# Patient Record
Sex: MALE | Race: WHITE | NOT HISPANIC OR LATINO | Employment: FULL TIME | URBAN - METROPOLITAN AREA
[De-identification: names, ages, dates, MRNs, and addresses within clinical notes are randomized per-mention and may not be internally consistent; named-entity substitution may affect disease eponyms.]

---

## 2022-09-29 ENCOUNTER — TELEPHONE (OUTPATIENT)
Dept: SCHEDULING | Facility: IMAGING CENTER | Age: 57
End: 2022-09-29

## 2022-09-29 ENCOUNTER — TELEPHONE (OUTPATIENT)
Dept: MEDICAL GROUP | Facility: LAB | Age: 57
End: 2022-09-29

## 2022-09-29 NOTE — TELEPHONE ENCOUNTER
NEW PATIENT VISIT PRE-VISIT PLANNING    1.  [employer] Patient is checked in Patient Demographics?Yes    2.  Immunizations were updated in Epic using Reconcile Outside Information activity? Yes         3.  Is this appointment scheduled as a Hospital Follow-Up? No    4.  Patient is due for the following Health Maintenance Topics:   Health Maintenance Due   Topic Date Due    IMM HEP B VACCINE (1 of 3 - 3-dose series) Never done    HEPATITIS C SCREENING  Never done    COVID-19 Vaccine (1) Never done    IMM DTaP/Tdap/Td Vaccine (1 - Tdap) Never done    COLORECTAL CANCER SCREENING  Never done    IMM ZOSTER VACCINES (1 of 2) Never done    IMM INFLUENZA (1) Never done     5.  Reviewed/Updated the following with patient:          Preferred Pharmacy? No          Preferred Lab? No          Preferred Communication? No          Allergies? No          Medications? NO     6.  Updated Care Team?          DME Company (gait device, O2, CPAP, etc.) NO          Other Specialists (eye doctor, derm, GYN, cardiology, endo, etc): NO    7.  AHA (Puls[employer]) form printed for Provider? N/A

## 2022-09-30 ENCOUNTER — OFFICE VISIT (OUTPATIENT)
Dept: MEDICAL GROUP | Facility: LAB | Age: 57
End: 2022-09-30
Payer: COMMERCIAL

## 2022-09-30 VITALS
OXYGEN SATURATION: 95 % | HEIGHT: 73 IN | WEIGHT: 183.9 LBS | HEART RATE: 66 BPM | SYSTOLIC BLOOD PRESSURE: 108 MMHG | BODY MASS INDEX: 24.37 KG/M2 | RESPIRATION RATE: 14 BRPM | DIASTOLIC BLOOD PRESSURE: 52 MMHG | TEMPERATURE: 96.3 F

## 2022-09-30 DIAGNOSIS — H66.90 ACUTE OTITIS MEDIA, UNSPECIFIED OTITIS MEDIA TYPE: ICD-10-CM

## 2022-09-30 DIAGNOSIS — G43.719 INTRACTABLE CHRONIC MIGRAINE WITHOUT AURA AND WITHOUT STATUS MIGRAINOSUS: ICD-10-CM

## 2022-09-30 DIAGNOSIS — E78.00 HYPERCHOLESTEROLEMIA: ICD-10-CM

## 2022-09-30 PROBLEM — G43.909 MIGRAINE HEADACHE: Status: ACTIVE | Noted: 2022-09-30

## 2022-09-30 PROBLEM — M54.17 LUMBOSACRAL RADICULOPATHY AT L5: Status: ACTIVE | Noted: 2022-06-09

## 2022-09-30 PROCEDURE — 99204 OFFICE O/P NEW MOD 45 MIN: CPT | Performed by: NURSE PRACTITIONER

## 2022-09-30 RX ORDER — MELOXICAM 15 MG/1
15 TABLET ORAL DAILY
COMMUNITY
Start: 2022-05-16 | End: 2023-05-16

## 2022-09-30 RX ORDER — AMOXICILLIN AND CLAVULANATE POTASSIUM 875; 125 MG/1; MG/1
1 TABLET, FILM COATED ORAL 2 TIMES DAILY
Qty: 14 TABLET | Refills: 0 | Status: SHIPPED | OUTPATIENT
Start: 2022-09-30

## 2022-09-30 RX ORDER — SUMATRIPTAN AND NAPROXEN SODIUM 85; 500 MG/1; MG/1
1 TABLET, FILM COATED ORAL
COMMUNITY
Start: 2022-05-17 | End: 2022-10-06 | Stop reason: SDUPTHER

## 2022-09-30 ASSESSMENT — PATIENT HEALTH QUESTIONNAIRE - PHQ9: CLINICAL INTERPRETATION OF PHQ2 SCORE: 0

## 2022-09-30 NOTE — ASSESSMENT & PLAN NOTE
This is a chronic condition, new to me. Patient managing with lifestyle modifications. Denies chest pain, shortness of breath, heart palpitations.

## 2022-09-30 NOTE — ASSESSMENT & PLAN NOTE
This is a chronic condition, new to me. Patient will taking sumatriptan-naproxen as abortive therapy with relief. He has noticed a decrease in frequency since he started changing his diet.

## 2022-09-30 NOTE — PROGRESS NOTES
"Subjective:     CC:    Chief Complaint   Patient presents with    Establish Care    Cold Symptoms     Ear are blocked x 1 week        HISTORY OF THE PRESENT ILLNESS: Patient is a 57 y.o. male. This pleasant patient is here today to establish care and discuss the following:    Otalgia  Patient reports that he had a cold a few weeks ago. Symptoms have resolved except for ear fullness. [place] noticed some increased pressure/pain in his ears bilaterally. Since then he feels like ear pain has been worsening. He has tried taking Afrin without relief.   Denies sore throat, fever, congestion, sinus pain/pressure, fever, chills, N/V.     Hypercholesterolemia  This is a chronic condition, new to me. Patient managing with lifestyle modifications. Denies chest pain, shortness of breath, heart palpitations.     Migraine headache  This is a chronic condition, new to me. Patient will taking sumatriptan-naproxen as abortive therapy with relief. He has noticed a decrease in frequency since he started changing his diet.     ROS:   As documented in history of present illness above      Objective:     Exam: /52 (BP Location: Left arm, Patient Position: Sitting, BP Cuff Size: Adult)   Pulse 66   Temp (!) 35.7 °C (96.3 °F)   Resp 14   Ht 1.854 m (6' 1\")   Wt 83.4 kg (183 lb 14.4 oz)   SpO2 95%  Body mass index is 24.26 kg/m².    Constitutional: Alert, no distress, well-groomed.  Skin: Warm, dry, good turgor, no rashes in visible areas.  Eye: Equal, round and reactive, conjunctiva clear, lids normal.  ENMT: Lips without lesions, good dentition, moist mucous membranes. Left TM bulging and erythematous.   Neck: Trachea midline, no masses, no thyromegaly.  Respiratory: Unlabored respiratory effort, no cough.  MSK: Normal gait, moves all extremities.  Neuro: Grossly non-focal.   Psych: Alert and oriented x3, normal affect and mood.    Assessment & Plan:   57 y.o. male with the following -    1. Acute " otitis media, unspecified otitis media type  Recommend using Flonase, over-the-counter decongestants as needed and antibiotics as prescribed. Tylenol or Motrin as needed for headache or discomfort. Supportive care, differential diagnoses, and indications for immediate follow-up discussed with patient. Pathogenesis of diagnosis discussed including typical length and natural progression. Instructed to return to clinic for any change in condition, further concerns, or worsening of symptoms.  - amoxicillin-clavulanate (AUGMENTIN) 875-125 MG Tab; Take 1 Tablet by mouth 2 times a day.  Dispense: 14 Tablet; Refill: 0    2. Hypercholesterolemia  Chronic, stable condition. Labs as indicated. Continue lifestyle modifications.    3. Intractable chronic migraine without aura and without status migrainosus  Chronic, stable condition. Patient to take medication as prescribed. Low tyramine diet recommended.  Decrease caffeine, maintain regular sleep schedule and at least 30-minutes of exercise most days.    Please note that this dictation was created using voice recognition software. I have made every reasonable attempt to correct obvious errors, but I expect that there are errors of grammar and possibly content that I did not discover before finalizing the note.

## 2022-10-06 NOTE — TELEPHONE ENCOUNTER
Received request via: Patient    Was the patient seen in the last year in this department? Yes  9/30/22  Does the patient have an active prescription (recently filled or refills available) for medication(s) requested? No

## 2022-10-07 RX ORDER — SUMATRIPTAN AND NAPROXEN SODIUM 85; 500 MG/1; MG/1
1 TABLET, FILM COATED ORAL DAILY
Qty: 30 TABLET | Refills: 3 | Status: SHIPPED | OUTPATIENT
Start: 2022-10-07 | End: 2023-09-07 | Stop reason: SDUPTHER

## 2022-12-09 ENCOUNTER — OFFICE VISIT (OUTPATIENT)
Dept: MEDICAL GROUP | Facility: LAB | Age: 57
End: 2022-12-09
Payer: COMMERCIAL

## 2022-12-09 VITALS
RESPIRATION RATE: 14 BRPM | WEIGHT: 190.8 LBS | TEMPERATURE: 97.5 F | DIASTOLIC BLOOD PRESSURE: 62 MMHG | HEART RATE: 86 BPM | SYSTOLIC BLOOD PRESSURE: 104 MMHG | OXYGEN SATURATION: 96 % | HEIGHT: 73 IN | BODY MASS INDEX: 25.29 KG/M2

## 2022-12-09 DIAGNOSIS — R10.12 LEFT UPPER QUADRANT ABDOMINAL PAIN: ICD-10-CM

## 2022-12-09 DIAGNOSIS — R10.9 FLANK PAIN: ICD-10-CM

## 2022-12-09 PROCEDURE — 99214 OFFICE O/P EST MOD 30 MIN: CPT | Performed by: NURSE PRACTITIONER

## 2022-12-10 NOTE — PROGRESS NOTES
"Subjective:     CC:   Chief Complaint   Patient presents with    Pain     Close to L side of rib cage      HPI:   Zen presents today with the following:    Left side pain  Patient reports pain on his left side/flank for the last several days. Pain does not radiate.   Pain is worse at night, less noticeable during activity. Not worse with movements.   He has not been taking pain medication.   Denies dysuria, urgency, frequency, fever, chills, N/V, change in bowels, black/tarry stools.     ROS:   As documented in history of present illness above    Objective:     Exam: /62 (BP Location: Right arm, Patient Position: Sitting, BP Cuff Size: Adult)   Pulse 86   Temp 36.4 °C (97.5 °F)   Resp 14   Ht 1.854 m (6' 1\")   Wt 86.5 kg (190 lb 12.8 oz)   SpO2 96%  Body mass index is 25.17 kg/m².    Constitutional: Alert, no distress, well-groomed.  Skin: Warm, dry, good turgor, no rashes in visible areas.  Eye: Equal, round and reactive, conjunctiva clear, lids normal.  ENMT: Lips without lesions, good dentition, moist mucous membranes.  Neck: Trachea midline, no masses, no thyromegaly.  Respiratory: Unlabored respiratory effort, no cough. Clear to ausculation. No rales, ronchi, or wheezing.  Cardiovascular: Regular rate and rhythm without murmur. Carotid and radial pulses are intact and equal bilaterally.  Abdomen: Soft, nondistended. Normal bowel sounds. Liver and spleen are not palpable. No guarding, no rebound tenderness. Negative Yusuf's sign, Rovsing's sign, McBurney's sign. No CVAT.  MSK: Normal gait, moves all extremities.  Neuro: Grossly non-focal.   Psych: Alert and oriented x3, normal affect and mood.    Assessment & Plan:     57 y.o. male with the following -     1. Flank pain  2. Left upper quadrant abdominal pain  CT ordered. Labs ordered. Supportive care, differential diagnoses, and indications for immediate follow-up discussed with patient. Instructed to return to clinic or nearest emergency " department for any change in condition, further concerns, or worsening of symptoms.  - CBC WITHOUT DIFFERENTIAL; Future  - Comp Metabolic Panel; Future  - Lipid Profile; Future  - HEMOGLOBIN A1C; Future  - TSH WITH REFLEX TO FT4; Future  - VITAMIN D,25 HYDROXY (DEFICIENCY); Future  - CT-RENAL COLIC EVALUATION(A/P W/O); Future

## 2022-12-13 ENCOUNTER — TELEPHONE (OUTPATIENT)
Dept: MEDICAL GROUP | Facility: LAB | Age: 57
End: 2022-12-13
Payer: COMMERCIAL

## 2022-12-13 NOTE — TELEPHONE ENCOUNTER
1. Caller Name:      Viktoria                   Call Back Number: [phone]      How would the patient prefer to be contacted with a response:       Voicemail left asking for a call back to discuss the labs that were placed for her . He was given labs from the [facility] asking if they may be duplicates.

## 2023-01-13 ENCOUNTER — APPOINTMENT (OUTPATIENT)
Dept: RADIOLOGY | Facility: MEDICAL CENTER | Age: 58
End: 2023-01-13
Attending: NURSE PRACTITIONER
Payer: COMMERCIAL

## 2023-01-27 ENCOUNTER — APPOINTMENT (OUTPATIENT)
Dept: RADIOLOGY | Facility: MEDICAL CENTER | Age: 58
End: 2023-01-27
Attending: NURSE PRACTITIONER
Payer: COMMERCIAL

## 2023-02-21 LAB — HBA1C MFR BLD: 5.5 % (ref ?–5.8)

## 2023-09-07 NOTE — TELEPHONE ENCOUNTER
Received request via: Pharmacy    Was the patient seen in the last year in this department? Yes  LOV : 12/9/2022   Does the patient have an active prescription (recently filled or refills available) for medication(s) requested? No    Does the patient have [facility] Plus and need 100 day supply (blood pressure, diabetes and cholesterol meds only)? Patient does not have SCP

## 2023-09-11 RX ORDER — SUMATRIPTAN AND NAPROXEN SODIUM 85; 500 MG/1; MG/1
1 TABLET, FILM COATED ORAL DAILY
Qty: 30 TABLET | Refills: 3 | Status: SHIPPED | OUTPATIENT
Start: 2023-09-11

## 2024-05-28 ENCOUNTER — APPOINTMENT (RX ONLY)
Dept: URBAN - METROPOLITAN AREA CLINIC 15 | Facility: CLINIC | Age: 59
Setting detail: DERMATOLOGY
End: 2024-05-28

## 2024-05-28 DIAGNOSIS — Z41.9 ENCOUNTER FOR PROCEDURE FOR PURPOSES OTHER THAN REMEDYING HEALTH STATE, UNSPECIFIED: ICD-10-CM

## 2024-05-28 DIAGNOSIS — L57.8 OTHER SKIN CHANGES DUE TO CHRONIC EXPOSURE TO NONIONIZING RADIATION: ICD-10-CM

## 2024-05-28 DIAGNOSIS — L81.5 LEUKODERMA, NOT ELSEWHERE CLASSIFIED: ICD-10-CM

## 2024-05-28 DIAGNOSIS — L57.0 ACTINIC KERATOSIS: ICD-10-CM

## 2024-05-28 PROCEDURE — ? COSMETIC CONSULTATION: FRACTIONAL RESURFACING

## 2024-05-28 PROCEDURE — ? MEDICATION COUNSELING

## 2024-05-28 PROCEDURE — ? DIAGNOSIS COMMENT

## 2024-05-28 PROCEDURE — 99203 OFFICE O/P NEW LOW 30 MIN: CPT

## 2024-05-28 PROCEDURE — ? MEDICAL CONSULTATION: PHOTODYNAMIC THERAPY

## 2024-05-28 PROCEDURE — ? DEFER

## 2024-05-28 PROCEDURE — ? COUNSELING

## 2024-05-28 ASSESSMENT — LOCATION ZONE DERM
LOCATION ZONE: SCALP
LOCATION ZONE: ARM
LOCATION ZONE: FACE

## 2024-05-28 ASSESSMENT — LOCATION DETAILED DESCRIPTION DERM
LOCATION DETAILED: LEFT DISTAL DORSAL FOREARM
LOCATION DETAILED: RIGHT SUPERIOR LATERAL MALAR CHEEK
LOCATION DETAILED: RIGHT PROXIMAL DORSAL FOREARM
LOCATION DETAILED: MEDIAL FRONTAL SCALP

## 2024-05-28 ASSESSMENT — LOCATION SIMPLE DESCRIPTION DERM
LOCATION SIMPLE: LEFT FOREARM
LOCATION SIMPLE: FRONTAL SCALP
LOCATION SIMPLE: RIGHT FOREARM
LOCATION SIMPLE: RIGHT CHEEK

## 2024-05-28 ASSESSMENT — SEVERITY ASSESSMENT: SEVERITY: MODERATE

## 2024-05-28 ASSESSMENT — BSA RASH: BSA RASH: 6

## 2024-06-04 ENCOUNTER — APPOINTMENT (RX ONLY)
Dept: URBAN - METROPOLITAN AREA CLINIC 15 | Facility: CLINIC | Age: 59
Setting detail: DERMATOLOGY
End: 2024-06-04

## 2024-06-04 DIAGNOSIS — L82.0 INFLAMED SEBORRHEIC KERATOSIS: ICD-10-CM

## 2024-06-04 DIAGNOSIS — L57.0 ACTINIC KERATOSIS: ICD-10-CM

## 2024-06-04 PROCEDURE — 17003 DESTRUCT PREMALG LES 2-14: CPT

## 2024-06-04 PROCEDURE — ? COUNSELING

## 2024-06-04 PROCEDURE — ? DIAGNOSIS COMMENT

## 2024-06-04 PROCEDURE — ? LIQUID NITROGEN

## 2024-06-04 PROCEDURE — 17000 DESTRUCT PREMALG LESION: CPT

## 2024-06-04 ASSESSMENT — LOCATION SIMPLE DESCRIPTION DERM
LOCATION SIMPLE: LEFT SCALP
LOCATION SIMPLE: RIGHT CHEEK
LOCATION SIMPLE: LEFT FOREHEAD
LOCATION SIMPLE: RIGHT FOREHEAD
LOCATION SIMPLE: RIGHT EYELID
LOCATION SIMPLE: SCALP
LOCATION SIMPLE: LEFT THIGH

## 2024-06-04 ASSESSMENT — LOCATION DETAILED DESCRIPTION DERM
LOCATION DETAILED: RIGHT LATERAL CANTHUS
LOCATION DETAILED: LEFT FOREHEAD
LOCATION DETAILED: RIGHT SUPERIOR CENTRAL MALAR CHEEK
LOCATION DETAILED: RIGHT SUPERIOR PARIETAL SCALP
LOCATION DETAILED: LEFT SUPERIOR MEDIAL FOREHEAD
LOCATION DETAILED: LEFT ANTERIOR PROXIMAL THIGH
LOCATION DETAILED: RIGHT CENTRAL MALAR CHEEK
LOCATION DETAILED: RIGHT INFERIOR LATERAL FOREHEAD
LOCATION DETAILED: LEFT LATERAL FOREHEAD
LOCATION DETAILED: LEFT MEDIAL FRONTAL SCALP

## 2024-06-04 ASSESSMENT — LOCATION ZONE DERM
LOCATION ZONE: FACE
LOCATION ZONE: SCALP
LOCATION ZONE: EYELID
LOCATION ZONE: LEG

## 2024-06-20 ENCOUNTER — APPOINTMENT (RX ONLY)
Dept: URBAN - METROPOLITAN AREA CLINIC 36 | Facility: CLINIC | Age: 59
Setting detail: DERMATOLOGY
End: 2024-06-20

## 2024-06-20 DIAGNOSIS — L57.0 ACTINIC KERATOSIS: ICD-10-CM

## 2024-06-20 PROCEDURE — 17000 DESTRUCT PREMALG LESION: CPT

## 2024-06-20 PROCEDURE — ? COUNSELING

## 2024-06-20 PROCEDURE — ? LIQUID NITROGEN

## 2024-06-20 ASSESSMENT — LOCATION ZONE DERM: LOCATION ZONE: FACE

## 2024-06-20 ASSESSMENT — LOCATION SIMPLE DESCRIPTION DERM: LOCATION SIMPLE: LEFT FOREHEAD

## 2024-06-20 ASSESSMENT — LOCATION DETAILED DESCRIPTION DERM: LOCATION DETAILED: LEFT SUPERIOR FOREHEAD

## 2024-08-28 RX ORDER — SUMATRIPTAN AND NAPROXEN SODIUM 85; 500 MG/1; MG/1
1 TABLET, FILM COATED ORAL DAILY
Qty: 30 TABLET | Refills: 0 | Status: SHIPPED | OUTPATIENT
Start: 2024-08-28

## 2024-08-28 NOTE — TELEPHONE ENCOUNTER
Received request via: Patient    Was the patient seen in the last year in this department? No    Does the patient have an active prescription (recently filled or refills available) for medication(s) requested? No    [facility]    Does the patient have [facility] Plus and need 100-day supply? (This applies to ALL medications) Patient does not have SCP      Pt going out of town

## 2024-11-07 ENCOUNTER — OFFICE VISIT (OUTPATIENT)
Dept: MEDICAL GROUP | Facility: LAB | Age: 59
End: 2024-11-07
Payer: COMMERCIAL

## 2024-11-07 VITALS
BODY MASS INDEX: 25.02 KG/M2 | DIASTOLIC BLOOD PRESSURE: 64 MMHG | HEIGHT: 73 IN | OXYGEN SATURATION: 95 % | WEIGHT: 188.8 LBS | RESPIRATION RATE: 14 BRPM | TEMPERATURE: 96.9 F | HEART RATE: 80 BPM | SYSTOLIC BLOOD PRESSURE: 116 MMHG

## 2024-11-07 DIAGNOSIS — R10.31 RIGHT INGUINAL PAIN: ICD-10-CM

## 2024-11-07 DIAGNOSIS — K08.89 TOOTHACHE: ICD-10-CM

## 2024-11-07 DIAGNOSIS — E78.00 HYPERCHOLESTEROLEMIA: ICD-10-CM

## 2024-11-07 DIAGNOSIS — G43.719 INTRACTABLE CHRONIC MIGRAINE WITHOUT AURA AND WITHOUT STATUS MIGRAINOSUS: ICD-10-CM

## 2024-11-07 PROCEDURE — 99214 OFFICE O/P EST MOD 30 MIN: CPT | Performed by: NURSE PRACTITIONER

## 2024-11-07 PROCEDURE — 3074F SYST BP LT 130 MM HG: CPT | Performed by: NURSE PRACTITIONER

## 2024-11-07 PROCEDURE — 3078F DIAST BP <80 MM HG: CPT | Performed by: NURSE PRACTITIONER

## 2024-11-07 RX ORDER — SUMATRIPTAN AND NAPROXEN SODIUM 85; 500 MG/1; MG/1
1 TABLET, FILM COATED ORAL DAILY
Qty: 30 TABLET | Refills: 6 | Status: SHIPPED | OUTPATIENT
Start: 2024-11-07

## 2024-11-07 NOTE — PROGRESS NOTES
"Chief Complaint   Patient presents with    Medication Refill    Hernia     Concerns      Verbal consent was acquired by the patient to use [employer] ambient listening note generation during this visit Yes      HPI:  History of Present Illness  The patient presents for evaluation of multiple medical concerns.    He is worried about a potential hernia, having previously undergone two inguinal hernia repairs. Recently, while exercising at the gym, he experienced a twinge and has since been feeling intermittent discomfort. He is unsure if this is related to his posture or movement. He reports no visible bumps or bulges.     He has a broken tooth that occasionally causes irritation. To manage this, he takes amoxicillin as needed, which he finds effective. He has consulted with dentists regarding tooth replacement but is not satisfied with the proposed solutions. He is considering a dental implant but is hesitant due to the invasive nature of the procedure.    He uses sumatriptan as needed, approximately six times a month, to manage his symptoms. He experiences periods of relief lasting one to two weeks, followed by four consecutive days of discomfort. His mother also had similar symptoms, and he has been experiencing them since his teenage years.    He has a history of high cholesterol, which he believes should have improved due to weight loss. He has not previously discussed a CT cardiac score with his doctor.     ROS:  As documented in history of present illness above    Exam:   /64 (BP Location: Right arm, Patient Position: Sitting, BP Cuff Size: Adult)   Pulse 80   Temp 36.1 °C (96.9 °F)   Resp 14   Ht 1.854 m (6' 1\")   Wt 85.6 kg (188 lb 12.8 oz)   SpO2 95%   BMI 24.91 kg/m²     Constitutional: Alert, no distress, well-groomed.  Skin: Warm, dry, good turgor, no rashes in visible areas.  Eye: Equal, round and reactive, conjunctiva clear, lids normal.  ENMT: Lips without lesions, good dentition, moist " mucous membranes.  Neck: Trachea midline, no masses, no thyromegaly.  Respiratory: Unlabored respiratory effort, no cough.  MSK: Normal gait, moves all extremities.  Neuro: Grossly non-focal.   Psych: Alert and oriented x3, normal affect and mood.    Assessment / Plan:  Assessment & Plan  1. Right inguinal pain  An ultrasound will be ordered to confirm the presence of a hernia. If the ultrasound confirms a hernia, a referral to general surgery will be made to discuss treatment options. He will be contacted via [employer] [employer] with the results.    2. Toothache.  Medication refilled. A prescription for amoxicillin has been sent to the [facility] pharmacy in [place].    3. Migraine  A prescription for sumatriptan has been sent to the [facility] pharmacy in [place]. He uses this medication as needed, approximately six times a month.    4. Hypercholesterolemia.  A CAC score will be ordered to assess for plaque buildup in the blood vessels surrounding the heart. If significant plaque buildup is detected, a discussion regarding medication or a referral to cardiology will be initiated. He will be contacted via [employer] [employer] with the results.

## 2025-05-05 ENCOUNTER — OFFICE VISIT (OUTPATIENT)
Dept: MEDICAL GROUP | Facility: LAB | Age: 60
End: 2025-05-05
Payer: COMMERCIAL

## 2025-05-05 VITALS
OXYGEN SATURATION: 95 % | BODY MASS INDEX: 25.92 KG/M2 | RESPIRATION RATE: 14 BRPM | HEIGHT: 73 IN | SYSTOLIC BLOOD PRESSURE: 118 MMHG | HEART RATE: 68 BPM | WEIGHT: 195.6 LBS | TEMPERATURE: 97.3 F | DIASTOLIC BLOOD PRESSURE: 62 MMHG

## 2025-05-05 DIAGNOSIS — R10.11 RUQ PAIN: ICD-10-CM

## 2025-05-05 DIAGNOSIS — R10.9 FLANK PAIN: ICD-10-CM

## 2025-05-05 DIAGNOSIS — E78.00 HYPERCHOLESTEROLEMIA: ICD-10-CM

## 2025-05-05 LAB
APPEARANCE UR: CLEAR
BILIRUB UR STRIP-MCNC: NORMAL MG/DL
COLOR UR AUTO: YELLOW
GLUCOSE UR STRIP.AUTO-MCNC: NORMAL MG/DL
KETONES UR STRIP.AUTO-MCNC: NORMAL MG/DL
LEUKOCYTE ESTERASE UR QL STRIP.AUTO: NORMAL
NITRITE UR QL STRIP.AUTO: NORMAL
PH UR STRIP.AUTO: 6 [PH] (ref 5–8)
PROT UR QL STRIP: NORMAL MG/DL
RBC UR QL AUTO: NORMAL
SP GR UR STRIP.AUTO: 1.02
UROBILINOGEN UR STRIP-MCNC: 0.2 MG/DL

## 2025-05-05 PROCEDURE — 3074F SYST BP LT 130 MM HG: CPT | Performed by: NURSE PRACTITIONER

## 2025-05-05 PROCEDURE — 81002 URINALYSIS NONAUTO W/O SCOPE: CPT | Performed by: NURSE PRACTITIONER

## 2025-05-05 PROCEDURE — 99214 OFFICE O/P EST MOD 30 MIN: CPT | Performed by: NURSE PRACTITIONER

## 2025-05-05 PROCEDURE — 3078F DIAST BP <80 MM HG: CPT | Performed by: NURSE PRACTITIONER

## 2025-05-05 ASSESSMENT — PATIENT HEALTH QUESTIONNAIRE - PHQ9: CLINICAL INTERPRETATION OF PHQ2 SCORE: 0

## 2025-05-05 NOTE — PROGRESS NOTES
Chief Complaint   Patient presents with    Pain     Sharp pain right side front to back ribcage area     Verbal consent was acquired by the patient to use [employer] ambient listening note generation during this visit Yes      HPI:  History of Present Illness  The patient presents for evaluation of abdominal pain.    He reports experiencing a distinct type of pain, described as sharp and radiating, originating from beneath his rib cage. This pain is different from the back pain, which he attributes to muscle strain. The pain can occur concurrently, with the back pain being more tolerable than the front. The intensity of the front pain is so severe that it disrupts his mental focus, necessitating him to lie down. The most recent episode occurred a week ago while he was at a bar in [place], where he consumed soda water with ice and lime. The pain was so intense that he had to leave the establishment. He notes that the pain is frequently associated with food or liquid intake, although not always. He reports no urinary symptoms such as painful urination or blood in the urine. He has no history of kidney stones. His fluid intake is primarily composed of milk, tea, and coffee, with an estimated daily water intake of 4 to 5 cups. He has been experiencing this pain intermittently for the past 2 to 3 years, but it has recently escalated in severity. He maintains a low-fat diet and does not consume alcohol. He also takes sumatriptan for migraines and is concerned about its potential impact on his stomach and kidneys.    He has a history of elevated cholesterol levels, typically around 200. A CT scan was recommended during his last visit, but he has not yet undergone this procedure as he is seeking a higher resolution scan.    PAST SURGICAL HISTORY:  Hernia surgery approximately 30 years ago.    SOCIAL HISTORY  He does not drink alcohol.    FAMILY HISTORY  His mother and grandmother both had their gallbladders  "removed.    ROS:  As documented in history of present illness above    Exam:   /62 (BP Location: Right arm, Patient Position: Sitting, BP Cuff Size: Adult)   Pulse 68   Temp 36.3 °C (97.3 °F) (Temporal)   Resp 14   Ht 1.854 m (6' 1\")   Wt 88.7 kg (195 lb 9.6 oz)   SpO2 95%   BMI 25.81 kg/m²     Constitutional: Alert, no distress, well-groomed.  Skin: Warm, dry, good turgor, no rashes in visible areas.  Eye: Equal, round and reactive, conjunctiva clear, lids normal.  ENMT: Lips without lesions, good dentition, moist mucous membranes.  Neck: Trachea midline, no masses, no thyromegaly.  Respiratory: Unlabored respiratory effort, no cough.  Abdomen: Soft, nondistended. Normal bowel sounds. Liver and spleen are not palpable. No TTP. No guarding, no rebound tenderness. Negative Yusuf's sign, Rovsing's sign, McBurney's sign. No CVAT.  MSK: Normal gait, moves all extremities.  Neuro: Grossly non-focal.   Psych: Alert and oriented x3, normal affect and mood.    Assessment / Plan:  Assessment & Plan  1. Abdominal pain.  - The differential diagnosis includes gallstones and kidney stones. The pain is sharp and radiates to the back, often worsening after eating or drinking.  - There is no history of kidney stones. A urinalysis will be conducted to check for hematuria.  - If hematuria is present, a CT scan will be ordered. If no hematuria is detected, an ultrasound will be performed.  - Two ultrasound orders have been placed: one for the right upper quadrant and another for the kidneys. NSAIDs such as ibuprofen or Aleve are recommended for pain management. If gallstones are confirmed, a referral to General Surgery will be made. If kidney stones are identified, a consultation with Urology will be arranged. In case of acute pain, immediate medical attention at the ER is advised.    2. Elevated cholesterol.  - He has a history of elevated cholesterol levels, typically around 200.  - A CT scan was recommended during his " last visit, but he has not yet undergone this procedure as he is seeking a higher resolution scan.  - The order for the CT scan remains valid until 11/2025.  - He is advised to proceed with the CT scan as previously recommended.    Please note that this dictation was created using voice recognition software. I have made every reasonable attempt to correct obvious errors, but I expect that there are errors of grammar and possibly content that I did not discover before finalizing the note.

## 2025-06-04 ENCOUNTER — TELEPHONE (OUTPATIENT)
Dept: MEDICAL GROUP | Facility: LAB | Age: 60
End: 2025-06-04

## 2025-06-05 ENCOUNTER — TELEPHONE (OUTPATIENT)
Dept: MEDICAL GROUP | Facility: LAB | Age: 60
End: 2025-06-05
Payer: COMMERCIAL

## 2025-08-12 DIAGNOSIS — K82.8 GALLBLADDER SLUDGE: ICD-10-CM

## 2025-08-12 DIAGNOSIS — R10.11 RUQ PAIN: Primary | ICD-10-CM
